# Patient Record
Sex: FEMALE | Race: WHITE | Employment: FULL TIME | URBAN - METROPOLITAN AREA
[De-identification: names, ages, dates, MRNs, and addresses within clinical notes are randomized per-mention and may not be internally consistent; named-entity substitution may affect disease eponyms.]

---

## 2017-09-25 ENCOUNTER — OFFICE VISIT (OUTPATIENT)
Dept: OBGYN CLINIC | Facility: CLINIC | Age: 62
End: 2017-09-25

## 2017-09-25 VITALS — SYSTOLIC BLOOD PRESSURE: 127 MMHG | BODY MASS INDEX: 39 KG/M2 | DIASTOLIC BLOOD PRESSURE: 79 MMHG | WEIGHT: 204 LBS

## 2017-09-25 DIAGNOSIS — Z01.419 WELL WOMAN EXAM WITH ROUTINE GYNECOLOGICAL EXAM: Primary | ICD-10-CM

## 2017-09-25 DIAGNOSIS — Z12.31 SCREENING MAMMOGRAM, ENCOUNTER FOR: ICD-10-CM

## 2017-09-25 PROCEDURE — 99396 PREV VISIT EST AGE 40-64: CPT | Performed by: ADVANCED PRACTICE MIDWIFE

## 2017-09-25 NOTE — PROGRESS NOTES
HPI:    Patient ID: Kenneth Martin is a 65year old female. Stopped hormone therapy in last year and has started having increases in hot flashes but otherwise no other c/o        Review of Systems   Constitutional: Negative. Respiratory: Negative. SPECIMEN ADEQUACY:  Satisfactory for evaluation.  Endocervical or metaplastic cells present    GENERAL CATEGORIZATION:  Negative for intraepithelial lesion or malignancy          INTERPRETATION/RESULT:  Negative for intraepithelial lesion or m Value Date   Willam Hamilton Cervix/Endocervical 07/25/2014       Lab Results  Component Value Date   HPV1 Negative 07/25/2014         Current Outpatient Prescriptions:  Loratadine-Pseudoephedrine ER (ALLERGY RELIEF-D)  MG Oral Tablet 24 Hr Take 1 tablet by m Current Outpatient Prescriptions:  Loratadine-Pseudoephedrine ER (ALLERGY RELIEF-D)  MG Oral Tablet 24 Hr Take 1 tablet by mouth daily.  Disp: 30 tablet Rfl: 4   EVAMIST 1.53 MG/SPRAY Transdermal Solution Place 1 spray onto the skin daily exhibits no motion tenderness, no discharge and no friability. Right adnexum displays no mass, no tenderness and no fullness. Left adnexum displays no mass, no tenderness and no fullness. No vaginal discharge found.    Musculoskeletal: Normal range of motio

## 2017-09-28 LAB — HPV I/H RISK 1 DNA SPEC QL NAA+PROBE: NEGATIVE

## 2017-11-06 NOTE — LETTER
1/24/2022              Norma Kuhn        [address] [place] [address]         To Whom It May Concern,    This letter is to inform you that our office has made an attempt to reach you by letter to schedule an appointment.       Richar Pt notified. She doesn't have a gyno and needs a referral placed.

## 2017-12-04 PROBLEM — Z00.00 ROUTINE GENERAL MEDICAL EXAMINATION AT A HEALTH CARE FACILITY: Status: ACTIVE | Noted: 2017-12-04

## 2017-12-04 PROCEDURE — 86803 HEPATITIS C AB TEST: CPT | Performed by: INTERNAL MEDICINE

## 2017-12-21 ENCOUNTER — HOSPITAL ENCOUNTER (OUTPATIENT)
Dept: MAMMOGRAPHY | Facility: HOSPITAL | Age: 62
Discharge: HOME | End: 2017-12-21
Attending: ADVANCED PRACTICE MIDWIFE
Payer: COMMERCIAL

## 2017-12-21 DIAGNOSIS — Z12.31 SCREENING MAMMOGRAM, ENCOUNTER FOR: ICD-10-CM

## 2017-12-21 PROCEDURE — 77067 SCR MAMMO BI INCL CAD: CPT | Performed by: ADVANCED PRACTICE MIDWIFE

## 2018-10-01 ENCOUNTER — OFFICE VISIT (OUTPATIENT)
Dept: OBGYN CLINIC | Facility: CLINIC | Age: 63
End: 2018-10-01
Payer: COMMERCIAL

## 2018-10-01 VITALS
DIASTOLIC BLOOD PRESSURE: 77 MMHG | HEART RATE: 62 BPM | SYSTOLIC BLOOD PRESSURE: 115 MMHG | WEIGHT: 201.63 LBS | BODY MASS INDEX: 39 KG/M2

## 2018-10-01 DIAGNOSIS — Z01.419 ENCOUNTER FOR WELL WOMAN EXAM WITH ROUTINE GYNECOLOGICAL EXAM: Primary | ICD-10-CM

## 2018-10-01 DIAGNOSIS — Z12.31 ENCOUNTER FOR SCREENING MAMMOGRAM FOR BREAST CANCER: ICD-10-CM

## 2018-10-01 PROCEDURE — 99396 PREV VISIT EST AGE 40-64: CPT | Performed by: ADVANCED PRACTICE MIDWIFE

## 2018-10-01 NOTE — PROGRESS NOTES
HPI:    Patient ID: Ayesha Ocampo is a 59year old female. Has been estrogen free since January. Experiencing some hot flashes on fewer occasions and some increased joint aches. Is due for pap smear in 2020.   Is not taking any other medication or had DEPARTMENT OF PATHOLOGY AND LABORATORY MEDICINE                               [address] [place] [address]  [phone]                                           GYNECOLOGIC CYTOLOGY REPORT [id number]  -----------------------------------------------------------------------------------------                                           NOTE  The Pap smear is a screening test that aids in the detection of cervical cancer  and its precursors.   False Used      Tobacco comment: 1-2 cigarettes per day for 30 years    Alcohol use: Yes      Alcohol/week: 0.0 oz      Comment: socially    Drug use: No      Exercise: three times per week  runs.   Diet: doesn't watch            Current Outpatient Medications: no tenderness and no fullness. Left adnexum displays no mass, no tenderness and no fullness. No vaginal discharge found. Musculoskeletal: Normal range of motion. She exhibits no edema or tenderness. Lymphadenopathy:     She has no cervical adenopathy.

## 2018-12-22 ENCOUNTER — HOSPITAL ENCOUNTER (OUTPATIENT)
Dept: MAMMOGRAPHY | Facility: HOSPITAL | Age: 63
Discharge: HOME | End: 2018-12-22
Attending: ADVANCED PRACTICE MIDWIFE
Payer: COMMERCIAL

## 2018-12-22 DIAGNOSIS — Z12.31 ENCOUNTER FOR SCREENING MAMMOGRAM FOR BREAST CANCER: ICD-10-CM

## 2018-12-22 PROCEDURE — 77067 SCR MAMMO BI INCL CAD: CPT | Performed by: ADVANCED PRACTICE MIDWIFE

## 2018-12-22 PROCEDURE — 77063 BREAST TOMOSYNTHESIS BI: CPT | Performed by: ADVANCED PRACTICE MIDWIFE

## 2019-08-15 ENCOUNTER — OFFICE VISIT (OUTPATIENT)
Dept: SURGERY | Facility: CLINIC | Age: 64
End: 2019-08-15
Payer: COMMERCIAL

## 2019-08-15 VITALS
HEIGHT: 61 IN | SYSTOLIC BLOOD PRESSURE: 140 MMHG | DIASTOLIC BLOOD PRESSURE: 80 MMHG | WEIGHT: 209 LBS | BODY MASS INDEX: 39.46 KG/M2

## 2019-08-15 DIAGNOSIS — R63.2 BINGE EATING: ICD-10-CM

## 2019-08-15 DIAGNOSIS — E66.9 OBESITY (BMI 30-39.9): ICD-10-CM

## 2019-08-15 DIAGNOSIS — R63.5 WEIGHT GAIN: ICD-10-CM

## 2019-08-15 DIAGNOSIS — E78.00 HYPERCHOLESTEREMIA: ICD-10-CM

## 2019-08-15 DIAGNOSIS — F43.9 STRESS: ICD-10-CM

## 2019-08-15 DIAGNOSIS — M17.11 PRIMARY OSTEOARTHRITIS OF RIGHT KNEE: Primary | ICD-10-CM

## 2019-08-15 PROBLEM — M17.10 DJD (DEGENERATIVE JOINT DISEASE) OF KNEE: Status: ACTIVE | Noted: 2019-08-15

## 2019-08-15 PROCEDURE — 99204 OFFICE O/P NEW MOD 45 MIN: CPT | Performed by: INTERNAL MEDICINE

## 2019-08-15 NOTE — PROGRESS NOTES
[facility]ation Note       Date of Consult:  8/15/2019    Patient:  Rolando Zuluaga  :      1955  MRN:      [id number]    Referring Provider: Dr. Alek Almonte       Chief Complaint:  Patient presents with:  Consult  Weight Man file      Number of children: Not on file      Years of education: Not on file      Highest education level: Not on file    Occupational History      Not on file    Social Needs      Financial resource strain: Not on file      Food insecurity:        Worry Self-Exams: Not Asked    Social History Narrative      . Lives with male friend. 4 adopted children. 2 with MD.       .      Surgical History:    Past Surgical History:   Procedure Laterality Date   • REMOVAL GALLBLADDER  1982   • negative  Behavioral/Psych: positive for stress  Endocrine: negative  All other systems were reviewed and are negative.     Physical Exam:  General appearance: alert, appears stated age, cooperative and moderately obese  Head: Normocephalic, without obvious eat when not hungry    Goals for next month:  1. Keep a food log. 2. Drink 48-64 ounces of non-caloric beverages per day. No fruit juices or regular soda. 3. Increase activity-upper body exercises, walk 10 minutes per day.   4. Increase fruit and vegetabl

## 2019-09-26 ENCOUNTER — OFFICE VISIT (OUTPATIENT)
Dept: SURGERY | Facility: CLINIC | Age: 64
End: 2019-09-26
Payer: COMMERCIAL

## 2019-09-26 VITALS
OXYGEN SATURATION: 98 % | WEIGHT: 196 LBS | SYSTOLIC BLOOD PRESSURE: 145 MMHG | RESPIRATION RATE: 16 BRPM | DIASTOLIC BLOOD PRESSURE: 94 MMHG | HEART RATE: 68 BPM | HEIGHT: 61 IN | BODY MASS INDEX: 37 KG/M2

## 2019-09-26 DIAGNOSIS — E66.9 OBESITY (BMI 30-39.9): ICD-10-CM

## 2019-09-26 DIAGNOSIS — I10 ESSENTIAL HYPERTENSION: Primary | ICD-10-CM

## 2019-09-26 DIAGNOSIS — R63.2 BINGE EATING: ICD-10-CM

## 2019-09-26 DIAGNOSIS — M17.11 PRIMARY OSTEOARTHRITIS OF RIGHT KNEE: ICD-10-CM

## 2019-09-26 DIAGNOSIS — F43.9 STRESS: ICD-10-CM

## 2019-09-26 DIAGNOSIS — Z51.81 ENCOUNTER FOR THERAPEUTIC DRUG MONITORING: ICD-10-CM

## 2019-09-26 PROCEDURE — 99214 OFFICE O/P EST MOD 30 MIN: CPT | Performed by: INTERNAL MEDICINE

## 2019-09-26 NOTE — PROGRESS NOTES
[address] [place] [address]  Dept: [phone]       Patient:  Jason Vargas  :      1955  MRN:      [id number]    Chief Complaint:  Patient presents wi level: Not on file    Occupational History      Not on file    Social Needs      Financial resource strain: Not on file      Food insecurity:        Worry: Not on file        Inability: Not on file      Transportation needs:        Medical: Not on file children. 2 with MD.       .      Surgical History:    Past Surgical History:   Procedure Laterality Date   • REMOVAL GALLBLADDER  1982   • SHOULDER SURG PROC UNLISTED  2006    shoulder surgery     Family History:    Family History   Problem negative  Cardiovascular: negative  Gastrointestinal: negative  Musculoskeletal:negative  Neurological: negative  Behavioral/Psych: negative  Endocrine: negative  All other systems were reviewed and are negative    Physical Exam:   General appearance: aler walk 10 minutes per day. 4. Increase fruit and vegetable servings to 5-6 per day.       Tolerating vyvanse  Increased anxious  Will decrease dose to 10 mg    ekg done      Diagnoses and all orders for this visit:    Essential hypertension    Stress    Prim

## 2019-10-02 ENCOUNTER — OFFICE VISIT (OUTPATIENT)
Dept: OBGYN CLINIC | Facility: CLINIC | Age: 64
End: 2019-10-02
Payer: COMMERCIAL

## 2019-10-02 VITALS
HEIGHT: 61 IN | SYSTOLIC BLOOD PRESSURE: 126 MMHG | WEIGHT: 195 LBS | DIASTOLIC BLOOD PRESSURE: 82 MMHG | BODY MASS INDEX: 36.82 KG/M2

## 2019-10-02 DIAGNOSIS — Z01.419 ENCOUNTER FOR ANNUAL ROUTINE GYNECOLOGICAL EXAMINATION: Primary | ICD-10-CM

## 2019-10-02 DIAGNOSIS — Z12.31 VISIT FOR SCREENING MAMMOGRAM: ICD-10-CM

## 2019-10-02 DIAGNOSIS — Z23 NEED FOR VACCINATION: ICD-10-CM

## 2019-10-02 PROCEDURE — 90686 IIV4 VACC NO PRSV 0.5 ML IM: CPT | Performed by: ADVANCED PRACTICE MIDWIFE

## 2019-10-02 PROCEDURE — 99396 PREV VISIT EST AGE 40-64: CPT | Performed by: ADVANCED PRACTICE MIDWIFE

## 2019-10-02 PROCEDURE — 90471 IMMUNIZATION ADMIN: CPT | Performed by: ADVANCED PRACTICE MIDWIFE

## 2019-10-02 NOTE — PROGRESS NOTES
HPI:    Patient ID: Stephan Spatz is a 62year old female. Broke her left leg in January. Has completely recovered. No other change in medical history.   Last pap 2017 normal all past paps have been normal    12  Pt ROS documented      Review of Syste 07/25/2014         Current Outpatient Medications:  Lisdexamfetamine Dimesylate (VYVANSE) 10 MG Oral Cap Take 10 mg by mouth daily.  Disp: 30 capsule Rfl: 0   Loratadine-Pseudoephedrine ER (SM LORATA-DINE D)  MG Oral Tablet 24 Hr Take 1 tablet by mout Rfl: 0   Loratadine-Pseudoephedrine ER (SM LORATA-DINE D)  MG Oral Tablet 24 Hr Take 1 tablet by mouth once daily.  Disp: 30 tablet Rfl: 5   fluticasone-salmeterol 100-50 MCG/DOSE Inhalation Aerosol Powder, Breath Activated Inhale 1 puff into the lung Right: No inguinal adenopathy present. Left: No inguinal adenopathy present. Neurological: She is alert and oriented to person, place, and time. Skin: Skin is warm and dry. Psychiatric: She has a normal mood and affect.  Her behavior is

## 2019-10-26 DIAGNOSIS — R63.2 BINGE EATING: ICD-10-CM

## 2019-11-25 DIAGNOSIS — R63.2 BINGE EATING: ICD-10-CM

## 2019-12-05 ENCOUNTER — OFFICE VISIT (OUTPATIENT)
Dept: SURGERY | Facility: CLINIC | Age: 64
End: 2019-12-05
Payer: COMMERCIAL

## 2019-12-05 VITALS
DIASTOLIC BLOOD PRESSURE: 80 MMHG | SYSTOLIC BLOOD PRESSURE: 132 MMHG | BODY MASS INDEX: 37.19 KG/M2 | HEIGHT: 61 IN | WEIGHT: 197 LBS

## 2019-12-05 DIAGNOSIS — E66.9 OBESITY (BMI 30-39.9): ICD-10-CM

## 2019-12-05 DIAGNOSIS — F43.9 STRESS: ICD-10-CM

## 2019-12-05 DIAGNOSIS — Z51.81 ENCOUNTER FOR THERAPEUTIC DRUG MONITORING: ICD-10-CM

## 2019-12-05 DIAGNOSIS — I10 ESSENTIAL HYPERTENSION: Primary | ICD-10-CM

## 2019-12-05 DIAGNOSIS — E78.00 HYPERCHOLESTEREMIA: ICD-10-CM

## 2019-12-05 PROCEDURE — 99214 OFFICE O/P EST MOD 30 MIN: CPT | Performed by: INTERNAL MEDICINE

## 2019-12-05 NOTE — PROGRESS NOTES
[address] [place] [address]  Dept: [phone]       Patient:  Alvin López  :      1955  MRN:      [id number]    Chief Complaint:  Patient presents wi file      Years of education: Not on file      Highest education level: Not on file    Occupational History      Not on file    Social Needs      Financial resource strain: Not on file      Food insecurity:        Worry: Not on file        Inability: Not o History Narrative      . Lives with male friend. 4 adopted children. 2 with MD.       .      Surgical History:    Past Surgical History:   Procedure Laterality Date   • REMOVAL GALLBLADDER  1982   • SHOULDER SURG PROC UNLISTED  2006 walk    ROS:    Constitutional: negative  Respiratory: negative  Cardiovascular: negative  Gastrointestinal: negative  Musculoskeletal:negative  Neurological: negative  Behavioral/Psych: negative  Endocrine: negative  All other systems were reviewed and ar Increase activity-upper body exercises, walk 10 minutes per day. 4. Increase fruit and vegetable servings to 5-6 per day.       Tolerating vyvanse  Increased anxious with higher dose  Refill at 10 mg    ekg done    Plans to see an orthopedic doctor for her

## 2019-12-12 PROBLEM — M25.561 CHRONIC PAIN OF RIGHT KNEE: Status: ACTIVE | Noted: 2019-12-12

## 2019-12-12 PROBLEM — G89.29 CHRONIC PAIN OF RIGHT KNEE: Status: ACTIVE | Noted: 2019-12-12

## 2019-12-23 ENCOUNTER — HOSPITAL ENCOUNTER (OUTPATIENT)
Dept: MAMMOGRAPHY | Facility: HOSPITAL | Age: 64
Discharge: HOME | End: 2019-12-23
Attending: ADVANCED PRACTICE MIDWIFE
Payer: COMMERCIAL

## 2019-12-23 DIAGNOSIS — Z12.31 VISIT FOR SCREENING MAMMOGRAM: ICD-10-CM

## 2019-12-23 PROCEDURE — 77067 SCR MAMMO BI INCL CAD: CPT | Performed by: ADVANCED PRACTICE MIDWIFE

## 2019-12-23 PROCEDURE — 77063 BREAST TOMOSYNTHESIS BI: CPT | Performed by: ADVANCED PRACTICE MIDWIFE

## 2020-10-01 PROBLEM — J45.20 MILD INTERMITTENT ASTHMA WITHOUT COMPLICATION: Status: ACTIVE | Noted: 2020-10-01

## 2020-10-05 ENCOUNTER — OFFICE VISIT (OUTPATIENT)
Dept: OBGYN CLINIC | Facility: CLINIC | Age: 65
End: 2020-10-05
Payer: COMMERCIAL

## 2020-10-05 VITALS
BODY MASS INDEX: 41.97 KG/M2 | WEIGHT: 208.19 LBS | SYSTOLIC BLOOD PRESSURE: 118 MMHG | HEIGHT: 59 IN | DIASTOLIC BLOOD PRESSURE: 76 MMHG

## 2020-10-05 DIAGNOSIS — Z78.0 POSTMENOPAUSE: ICD-10-CM

## 2020-10-05 DIAGNOSIS — Z01.419 WELL WOMAN EXAM WITH ROUTINE GYNECOLOGICAL EXAM: ICD-10-CM

## 2020-10-05 DIAGNOSIS — R29.890 LOSS OF HEIGHT: ICD-10-CM

## 2020-10-05 DIAGNOSIS — Z12.31 SCREENING MAMMOGRAM, ENCOUNTER FOR: Primary | ICD-10-CM

## 2020-10-05 DIAGNOSIS — R62.52 SHORT STATURE: ICD-10-CM

## 2020-10-05 PROCEDURE — 3074F SYST BP LT 130 MM HG: CPT | Performed by: ADVANCED PRACTICE MIDWIFE

## 2020-10-05 PROCEDURE — 99396 PREV VISIT EST AGE 40-64: CPT | Performed by: ADVANCED PRACTICE MIDWIFE

## 2020-10-05 PROCEDURE — 3078F DIAST BP <80 MM HG: CPT | Performed by: ADVANCED PRACTICE MIDWIFE

## 2020-10-05 PROCEDURE — 3008F BODY MASS INDEX DOCD: CPT | Performed by: ADVANCED PRACTICE MIDWIFE

## 2020-10-05 NOTE — PROGRESS NOTES
HPI:    Patient ID: Austin Cochran is a 60year old female. Had a knee replacement this year. Having another knee replacement this month. Denies any other health problems. Occasional external itching uses a powder to sooth.        [facility] and [facility] Component Value Date    HPV1 Negative 07/25/2014       Current Outpatient Medications   Medication Sig Dispense Refill   • FLUTICASONE-SALMETEROL 100-50 MCG/DOSE Inhalation Aerosol Powder, Breath Activated USE 1 INHALATION DAILY, DISCARD 30 DAYS AFTER OP Medication Sig Dispense Refill   • FLUTICASONE-SALMETEROL 100-50 MCG/DOSE Inhalation Aerosol Powder, Breath Activated USE 1 INHALATION DAILY, DISCARD 30 DAYS AFTER OPENING 60 each 5   • hydrochlorothiazide 12.5 MG Oral Cap Take 1 capsule (12.5 mg total) edema.   Lymphadenopathy:     She has no cervical adenopathy. Right: No inguinal adenopathy present. Left: No inguinal adenopathy present. Neurological: She is alert and oriented to person, place, and time. Skin: Skin is warm and dry.    P

## 2020-12-17 PROBLEM — Z96.653 HISTORY OF BILATERAL KNEE REPLACEMENT: Status: ACTIVE | Noted: 2020-12-17

## 2020-12-29 ENCOUNTER — HOSPITAL ENCOUNTER (OUTPATIENT)
Dept: BONE DENSITY | Facility: HOSPITAL | Age: 65
Discharge: HOME | End: 2020-12-29
Attending: ADVANCED PRACTICE MIDWIFE
Payer: COMMERCIAL

## 2020-12-29 ENCOUNTER — HOSPITAL ENCOUNTER (OUTPATIENT)
Dept: MAMMOGRAPHY | Facility: HOSPITAL | Age: 65
Discharge: HOME | End: 2020-12-29
Attending: ADVANCED PRACTICE MIDWIFE
Payer: COMMERCIAL

## 2020-12-29 DIAGNOSIS — R29.890 LOSS OF HEIGHT: ICD-10-CM

## 2020-12-29 DIAGNOSIS — R62.52 SHORT STATURE: ICD-10-CM

## 2020-12-29 DIAGNOSIS — Z12.31 SCREENING MAMMOGRAM, ENCOUNTER FOR: ICD-10-CM

## 2020-12-29 DIAGNOSIS — Z78.0 POSTMENOPAUSE: ICD-10-CM

## 2020-12-29 PROCEDURE — 77067 SCR MAMMO BI INCL CAD: CPT | Performed by: ADVANCED PRACTICE MIDWIFE

## 2020-12-29 PROCEDURE — 77063 BREAST TOMOSYNTHESIS BI: CPT | Performed by: ADVANCED PRACTICE MIDWIFE

## 2020-12-29 PROCEDURE — 77080 DXA BONE DENSITY AXIAL: CPT | Performed by: ADVANCED PRACTICE MIDWIFE

## 2021-11-08 ENCOUNTER — TELEPHONE (OUTPATIENT)
Dept: OBGYN CLINIC | Facility: CLINIC | Age: 66
End: 2021-11-08

## 2021-11-08 NOTE — TELEPHONE ENCOUNTER
11/8/21 Pt was due for her 12 month pap smear follow up on October 2021. Due to positive HPV on pap from 10/05/2020. Recall letter sent.

## 2021-11-29 NOTE — TELEPHONE ENCOUNTER
Patient is overdue for her 1 year follow up pap smear due to positive HPV. No appointment scheduled as of yet. Will send second recall letter to patient.

## 2022-01-21 NOTE — TELEPHONE ENCOUNTER
Patient is past due for follow up pap smear 10/2021. Two recall letters have been sent to patient, no follow up appointment are made. May we send certified letter? Please advise.

## (undated) NOTE — LETTER
11/29/2021              Cuba White Hattie        [address]         Dear Dacia López,  After your last pap, YANCI Yeboah recommended that you have a repeat pap  done in 12 months. The repeat pap is due in 10/2021.

## (undated) NOTE — LETTER
11/8/2021              Phyllis Gardiner Hattie        [address] [address]         Dear Kylee,    After your last pap, Dr. Linda Gardner. Alvin Smith MD recommended that you have a repeat pap done in *** months. The repeat pap is due in ***.     Benji Limon

## (undated) NOTE — LETTER
10/2/2017              Carlene Kuhn        [facility]         Dear Myke Hobson,    It was a pleasure to see you. Your PAP test was normal.  There is no need for further testing at this time.   I look forward to seeing you at

## (undated) NOTE — LETTER
11/8/2021              Vickimanjula Julianadonis Hattie        [address] [address]         Dear Lyn Rodriguez,    After your last pap, YANCI Landa recommended that you have a repeat pap  done in 12 months. The repeat pap is due in October 2021.

## (undated) NOTE — Clinical Note
Saw Rosendo Lang today in clinic for weight loss. She has signs of binge eating, especially when stressed. I recommended possibly starting zoloft, as this would help her depression, anxiety and binge eating. We decided to start with Vyvanse at 20 mg.  She does clai